# Patient Record
Sex: MALE | Race: WHITE
[De-identification: names, ages, dates, MRNs, and addresses within clinical notes are randomized per-mention and may not be internally consistent; named-entity substitution may affect disease eponyms.]

---

## 2018-04-04 ENCOUNTER — HOSPITAL ENCOUNTER (EMERGENCY)
Dept: HOSPITAL 45 - C.EDB | Age: 68
LOS: 1 days | Discharge: HOME | End: 2018-04-05
Payer: COMMERCIAL

## 2018-04-04 VITALS
WEIGHT: 240.3 LBS | WEIGHT: 240.3 LBS | BODY MASS INDEX: 35.59 KG/M2 | HEIGHT: 69.02 IN | BODY MASS INDEX: 35.59 KG/M2 | HEIGHT: 69.02 IN

## 2018-04-04 VITALS — TEMPERATURE: 98.06 F

## 2018-04-04 DIAGNOSIS — Z79.82: ICD-10-CM

## 2018-04-04 DIAGNOSIS — S09.90XA: Primary | ICD-10-CM

## 2018-04-04 DIAGNOSIS — I10: ICD-10-CM

## 2018-04-04 DIAGNOSIS — Z88.2: ICD-10-CM
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
## 2018-04-04 LAB
ALBUMIN SERPL-MCNC: 3.8 GM/DL (ref 3.4–5)
ALP SERPL-CCNC: 94 U/L (ref 45–117)
ALT SERPL-CCNC: 24 U/L (ref 12–78)
AST SERPL-CCNC: 17 U/L (ref 15–37)
BASOPHILS # BLD: 0.03 K/UL (ref 0–0.2)
BASOPHILS NFR BLD: 0.3 %
BUN SERPL-MCNC: 16 MG/DL (ref 7–18)
CA-I BLD-SCNC: 1.2 MMOL/L (ref 1.12–1.32)
CALCIUM SERPL-MCNC: 8.8 MG/DL (ref 8.5–10.1)
CO2 SERPL-SCNC: 25 MMOL/L (ref 21–32)
CREAT BLD-MCNC: 1 MG/DL (ref 0.6–1.3)
CREAT SERPL-MCNC: 1.07 MG/DL (ref 0.6–1.4)
EOS ABS #: 0.1 K/UL (ref 0–0.5)
EOSINOPHIL NFR BLD AUTO: 248 K/UL (ref 130–400)
GLUCOSE SERPL-MCNC: 92 MG/DL (ref 70–99)
HCT VFR BLD CALC: 45.2 % (ref 42–52)
HGB BLD-MCNC: 15.3 G/DL (ref 14–18)
IG#: 0.02 K/UL (ref 0–0.02)
IMM GRANULOCYTES NFR BLD AUTO: 26 %
INR PPP: 0.9 (ref 0.9–1.1)
ISTAT POTASSIUM: 3.6 MEQ/L (ref 3.3–5)
LYMPHOCYTES # BLD: 2.48 K/UL (ref 1.2–3.4)
MCH RBC QN AUTO: 26.4 PG (ref 25–34)
MCHC RBC AUTO-ENTMCNC: 33.8 G/DL (ref 32–36)
MCV RBC AUTO: 78.1 FL (ref 80–100)
MONO ABS #: 1.01 K/UL (ref 0.11–0.59)
MONOCYTES NFR BLD: 10.6 %
NEUT ABS #: 5.89 K/UL (ref 1.4–6.5)
NEUTROPHILS # BLD AUTO: 1 %
NEUTROPHILS NFR BLD AUTO: 61.9 %
PMV BLD AUTO: 10.1 FL (ref 7.4–10.4)
POTASSIUM SERPL-SCNC: 3.5 MMOL/L (ref 3.5–5.1)
PROT SERPL-MCNC: 7.5 GM/DL (ref 6.4–8.2)
PTT PATIENT: 28.7 SECONDS (ref 21–31)
RED CELL DISTRIBUTION WIDTH CV: 17 % (ref 11.5–14.5)
RED CELL DISTRIBUTION WIDTH SD: 47.6 FL (ref 36.4–46.3)
SODIUM BLD-SCNC: 139 MEQ/L (ref 135–144)
SODIUM SERPL-SCNC: 136 MMOL/L (ref 136–145)
WBC # BLD AUTO: 9.53 K/UL (ref 4.8–10.8)

## 2018-04-04 NOTE — DIAGNOSTIC IMAGING REPORT
TWO VIEW CHEST



CLINICAL HISTORY: Mediastinal widening questioned on cervical spine CT.



FINDINGS: PA and lateral chest radiographs are correlated with CT of the

cervical spine dated 4/4/2018. The heart is top normal for projection. Mild

apparent widening of the mediastinum persists.  There is atherosclerotic

calcification of the thoracic aorta. The lungs and pleural spaces are clear.

There is no pneumothorax. The bony thorax appears intact.



IMPRESSION:



1. There is no acute cardiopulmonary abnormality.



2. Mild apparent widening of the mediastinum persists. This is of indeterminant

significance, and although this may be artifactual correlation with a

contrast-enhanced CT scan of the chest is recommended for further assessment.







Electronically signed by:  Jose Chance M.D.

4/4/2018 9:54 PM



Dictated Date/Time:  4/4/2018 9:52 PM

## 2018-04-04 NOTE — DIAGNOSTIC IMAGING REPORT
CT SCAN OF THE CERVICAL SPINE



CLINICAL HISTORY: Fall.



COMPARISON STUDY:  No priors.



TECHNIQUE: CT scan of the cervical spine is performed from the skull base to the

upper thoracic spine. Images are reviewed in the axial, sagittal, and coronal

planes. IV contrast was not administered for this examination.  A dose lowering

technique was utilized adhering to the principles of ALARA.



CT DOSE: 1205.16 mGy.cm



FINDINGS:



Skeletal structures: The skeletal structures are osteopenia. There is no

evidence of fracture or subluxation involving the cervical spine. Vertebral body

height is maintained. Minimal anterolisthesis is seen at C4-C5. Alignment is

otherwise preserved. There is straightening of the cervical lordosis with mild

reversal centered at C4-C5. Anterior osteophytes are seen in the lower cervical

region. The odontoid process and lateral masses are intact. The atlantoaxial

articulation is preserved noting advanced productive degenerative change. The

spinous processes appear intact. There is moderate multilevel cervical

spondylosis. Uncovertebral and facet arthropathy contribute to neural foraminal

stenosis at several levels.



Intervertebral discs: There is moderate disc space narrowing seen at C5-C6 and

C6-C7 with associated endplate sclerosis. Mild narrowing is seen at C4-C5.



Central canal: Posterior discussed by complex is at C2-C3, C3-C4, C5-C6, and

C6-C7 likely contribute to multilevel acquired compromise of the central canal.



Soft tissues: The prevertebral and paraspinous soft tissues are within normal

limits. A subcentimeter low-attenuation nodule is noted in the left thyroid

lobe.



Calvarium: The visualized calvarium at the skull base appears intact.



Brain parenchyma: Partially visualized brain parenchyma the skull base is within

normal limits.



Sinuses and mastoids: The visualized paranasal sinuses are clear. The mastoid

air cells are well pneumatized.



Lung apices: Apical lung parenchyma is clear as visualized. Mediastinal widening

is questioned on the  tomogram.





IMPRESSION:



1. There is no evidence of fracture or subluxation involving the cervical spine.



2. Osteopenia and multilevel spondylosis as above.



3. Mediastinal widening is questioned on the  tomogram. This may be on a

technical/projectional basis. Correlation with PA and lateral chest radiographs

is recommended.







Electronically signed by:  Jose Chance M.D.

4/4/2018 8:11 PM



Dictated Date/Time:  4/4/2018 8:07 PM

## 2018-04-04 NOTE — EMERGENCY ROOM VISIT NOTE
ED Visit Note


First contact with patient:  19:05


CHIEF  COMPLAINT: Fall, head injury





HISTORY OF PRESENTING ILLNESS: This is a 67-year-old male who presents to the 

emergency department with complaint of head injury from a fall at approximately 

4:45 PM today.  Patient states that he was walking down a ramp and lost his 

footing, tripping over a curb.  He states that he fell forward onto both hands 

and his left knee then to his left side hitting the left lateral side of his 

head.  He denies loss of consciousness.  He takes a baby aspirin daily, he 

denies any other blood thinners.  He states since hitting his head that he has 

had a constant dull headache rated 3/10.  He has associated symptoms of nausea, 

dizziness, and states "I feel fuzzy and just kind of off."  He denies any 

vomiting, blurry or double vision, confusion or memory problems.  He denies any 

neck pain.  He denies any back pain, chest pain, shortness of breath, syncope, 

abdominal pain, blood in his stool or urine, numbness or weakness of the 

extremities.  He has small abrasions to both hands and to the anterior left 

knee.  He denies any pain with range of motion of the left knee or walking on 

the left leg, denies any pain with range of motion of the bilateral wrists, 

elbows, or shoulders.  Denies any ankle or hip pain.





REVIEW OF SYSTEMS: A complete 10 point review of systems was reviewed with the 

patient with pertinent positives and negatives as per history of present 

illness. All else were negative.





PAST MEDICAL HISTORY: Hypertension





SOCIAL HISTORY: Lives at home.  He denies tobacco use, alcohol or recreational 

drug use.





ALLERGIES: Reviewed in chart.





PHYSICAL EXAM:


CONSTITUTIONAL: Pleasant and cooperative.  No acute distress. Well appearing 

and well nourished.  Interacts appropriately with provider.


HEAD: Normocephalic, Atraumatic. No Diamond's Sign or Raccoon's Eyes. No 

depressed skull fractures palpable.


EYES: PERRL with EOMI bilaterally. Without subconjunctival hemorrhage. 

Palpebral conjunctiva pink and moist with no injection.


EARS:  No deformities of external structures noted on gross examination 

bilaterally. No hemotympanum present. No tympanic perforation noted.


NOSE: Midline and without cyanosis. No epistaxis or clear watery discharge 

noted. Septum midline without deviation. No septal hematoma noted. No overlying 

ecchymosis noted.


MOUTH/OROPHARYNX: Without perioral cyanosis. Tongue midline with equal 

elevation of palate bilaterally. No blood noted in the oropharynx. No tonsillar 

hypertrophy, erythema, or exudates noted. No dental fractures noted.


NECK:  Supple, FROM assessed. No nuchal rigidity.  No tenderness to palpation 

over the cervical spinous processes.  Mild left-sided cervical paraspinal 

muscle tenderness noted.


RESPIRATORY: Clear to auscultation bilaterally with no wheezing, crackles, 

rhonchi or stridor. Equal expansion bilaterally.


CARDIOVASCULAR: Regular rate and rhythm with no murmurs, rubs or gallops. 

Normal peripheral perfusion. No edema.


GASTROINTESTINAL: Soft, nontender, nondistended. No palpable masses or HSM. 

Bowel sounds present in all quadrants. 


MUSCULOSKELETAL: Full range of motion of all joints without discomfort.  No 

gross deformities noted of the extremities. +2 radial and dorsalis pedis pulses 

palpated throughout. FROM with no tremors, fasciculations, or clonus noted on 

PROM throughout. +5/5 strength noted in UE/LE bilaterally.


BACK:  No midline tenderness or paraspinous tenderness to palpation of the 

thoracic or lumbar spine.  No ecchymosis or abrasions noted.


INTEGUMENTARY: Abrasions to bilateral palms of hands, abrasion to left anterior 

knee.  No rash or other significant dermatologic conditions noted.


NEUROLOGIC: Alert and oriented X 4 with normal affect.  Cranial nerves II-XII 

grossly intact. No focal neurologic deficits noted.  Sensory intact to light 

touch throughout.  Patient able to perform rapid alternating movements 

appropriately.  Negative Romberg and Pronator Drift.








ED COURSE AND MEDICAL DECISION MAKING: 





CC: Patient presenting with complaint of fall, head injury





DIFFERENTIAL DIAGNOSIS:  Includes, but not limited to concussion, skull fracture

, intracranial hemorrhage, hypertensive urgency/emergency, aortic dissection, 

among others.





INTERPRETATION OF LABS: No leukocytosis, no anemia, no significant electrolyte 

abnormalities, normal renal function.





IMAGING:  


CT SCAN OF THE BRAIN WITHOUT IV CONTRAST





CLINICAL HISTORY: Fall. Head injury.





COMPARISON STUDY:  No priors.





TECHNIQUE: Unenhanced axial CT scan of the brain is performed from the vertex to


the skull base. A dose lowering technique was utilized adhering to the


principles of ALARA.





FINDINGS:





Brain parenchyma: There are age-related involutional changes noting  minimal


periventricular microangiopathic change. There is no hemorrhage, mass effect, or


evidence of acute territorial ischemia by CT criteria. Gray-white matter is


preserved. No extra-axial fluid collection is seen.





Ventricles, sulci, cisterns: Prominent secondary to involutional change.





Intracranial vasculature: There is atherosclerotic calcification of the


cavernous carotid arteries.





Calvarium: The skeletal structures are osteopenic. There is no depressed


calvarial fracture.





Sinuses and mastoids: The visualized paranasal sinuses are clear. The mastoid


air cells are well pneumatized.





Orbits: The bony orbits are grossly intact.








IMPRESSION: No acute intracranial abnormality.





-----





CT SCAN OF THE CERVICAL SPINE





CLINICAL HISTORY: Fall.





COMPARISON STUDY:  No priors.





TECHNIQUE: CT scan of the cervical spine is performed from the skull base to the


upper thoracic spine. Images are reviewed in the axial, sagittal, and coronal


planes. IV contrast was not administered for this examination.  A dose lowering


technique was utilized adhering to the principles of ALARA.





CT DOSE: 1205.16 mGy.cm





FINDINGS:





Skeletal structures: The skeletal structures are osteopenia. There is no


evidence of fracture or subluxation involving the cervical spine. Vertebral body


height is maintained. Minimal anterolisthesis is seen at C4-C5. Alignment is


otherwise preserved. There is straightening of the cervical lordosis with mild


reversal centered at C4-C5. Anterior osteophytes are seen in the lower cervical


region. The odontoid process and lateral masses are intact. The atlantoaxial


articulation is preserved noting advanced productive degenerative change. The


spinous processes appear intact. There is moderate multilevel cervical


spondylosis. Uncovertebral and facet arthropathy contribute to neural foraminal


stenosis at several levels.





Intervertebral discs: There is moderate disc space narrowing seen at C5-C6 and


C6-C7 with associated endplate sclerosis. Mild narrowing is seen at C4-C5.





Central canal: Posterior discussed by complex is at C2-C3, C3-C4, C5-C6, and


C6-C7 likely contribute to multilevel acquired compromise of the central canal.





Soft tissues: The prevertebral and paraspinous soft tissues are within normal


limits. A subcentimeter low-attenuation nodule is noted in the left thyroid


lobe.





Calvarium: The visualized calvarium at the skull base appears intact.





Brain parenchyma: Partially visualized brain parenchyma the skull base is within


normal limits.





Sinuses and mastoids: The visualized paranasal sinuses are clear. The mastoid


air cells are well pneumatized.





Lung apices: Apical lung parenchyma is clear as visualized. Mediastinal widening


is questioned on the  tomogram.








IMPRESSION:





1. There is no evidence of fracture or subluxation involving the cervical spine.





2. Osteopenia and multilevel spondylosis as above.





3. Mediastinal widening is questioned on the  tomogram. This may be on a


technical/projectional basis. Correlation with PA and lateral chest radiographs


is recommended.





-----





TWO VIEW CHEST





CLINICAL HISTORY: Mediastinal widening questioned on cervical spine CT.





FINDINGS: PA and lateral chest radiographs are correlated with CT of the


cervical spine dated 4/4/2018. The heart is top normal for projection. Mild


apparent widening of the mediastinum persists.  There is atherosclerotic


calcification of the thoracic aorta. The lungs and pleural spaces are clear.


There is no pneumothorax. The bony thorax appears intact.





IMPRESSION:





1. There is no acute cardiopulmonary abnormality.





2. Mild apparent widening of the mediastinum persists. This is of indeterminant


significance, and although this may be artifactual correlation with a


contrast-enhanced CT scan of the chest is recommended for further assessment.





 


EKG: Normal sinus rhythm with a rate of 90 bpm, right bundle branch block by my 

interpretation.  No previous EKGs available for comparison.  Patient does 

report a history of a right bundle branch block





MEDICATION RECONCILIATION:  I attest that I have personally reviewed the patient

's current medication list.





INITIAL VITAL SIGNS REVIEW:  I reviewed the patient's initial vital signs and 

interpret them as follows: T: Afebrile;  BP: Hypertensive;  HR: Tachycardic;  RR

: Within normal limit;  Pulse Ox: Within normal limits on room air.


Blood pressure screening: The patient was found to have an elevated blood 

pressure and was referred to their primary doctor for recheck and further 

treatment.





SUMMARY: 


Patient was evaluated at bedside, history and physical exam performed.


Patient is alert and oriented, in no acute distress, resting calmly on the 

stretcher.


Neurologic exam is intact with no focal deficits.  He does complain of a 

headache and some left-sided neck pain.


Patient has mild abrasions to the bilateral hands and left knee.  Full range of 

motion of the knee, hands, and wrists without any pain or weakness.


Patient is noted to be markedly hypertensive, states he takes clonidine twice a 

day, but states he only took half of his usual dose this morning.


The patient states it is not unusual for him to have blood pressures in the 180s

-200s/100s-120s.


Orders were placed at bedside for EKG, Tylenol for headache, CT head and 

cervical spine to evaluate for trauma.


Patient discussed with Dr. Russo, who agrees with my assessment and plan.


Imaging reviewed as above, concern for mediastinal widening on chest x-ray and 

cervical spine CT. The patient continues to deny any chest pain, shortness of 

breath, back pain, and his neurovascular exam remains intact on reassessment.


Given this finding and the patient's history for significant hypertension, I 

feel it is the patient's best interest to fully evaluate for the possibility of 

dissection.  I discussed this with the patient, he verbalized understanding and 

was in agreement.


Labs were ordered.  CTA dissection study of the chest was ordered.


The patient has been persistently hypertensive, though he does note he is due 

for his clonidine.  The patient took his own home dose of PO clonidine, 0.1 mg, 

at 11 PM, with my direct supervision.  Will continue to closely monitor his 

blood pressure.


Patient reassessed multiple times throughout ED stay, he has remained well-

appearing and stable, his tachycardia is downtrending.


Patient was signed out to Florentin White PA-C, at change of shift, awaiting 

results of the CTA.  Patient was stable at time of sign-out.


Current/Historical Medications


Scheduled


Aspirin (Aspirin Ec), 81 MG PO DAILY


Clonidine Hcl (Catapres), 0.1 MG PO BID


Diazepam (Valium), 0.5 TAB PO PRN UD


Eszopiclone (Lunesta), 3 MG PO HS


Rabeprazole Sodium (Aciphex), 40 MG PO DAILY





Scheduled PRN


Acetaminophen (Tylenol), 750-1,000 MG PO BID PRN for Pain





Allergies


Coded Allergies:  


     Sulfa Antibiotics (Verified  Allergy, Severe, FATIGUE, 4/4/18)


Uncoded Allergies:  


     ADULT STRENGTH ASPIRIN (Allergy, Intermediate, HEAD, HAND HIVES, 4/4/18)





Vital Signs











  Date Time  Temp Pulse Resp B/P (MAP) Pulse Ox O2 Delivery O2 Flow Rate FiO2


 


4/4/18 23:12  80 18 200/125 97 Room Air  


 


4/4/18 22:57  81   96 Room Air  


 


4/4/18 22:44    206/112    


 


4/4/18 22:42    209/112    


 


4/4/18 21:27  83   95   


 


4/4/18 21:22  83 22 180/108 95   


 


4/4/18 19:26  97 20  95 Room Air  


 


4/4/18 19:21    186/109    


 


4/4/18 19:01 36.7 103 20 207/119 95 Room Air  











Laboratory Results


4/4/18 22:50








Red Blood Count 5.79, Mean Corpuscular Volume 78.1, Mean Corpuscular Hemoglobin 

26.4, Mean Corpuscular Hemoglobin Concent 33.8, Mean Platelet Volume 10.1, 

Neutrophils (%) (Auto) 61.9, Lymphocytes (%) (Auto) 26.0, Monocytes (%) (Auto) 

10.6, Eosinophils (%) (Auto) 1.0, Basophils (%) (Auto) 0.3, Neutrophils # (Auto

) 5.89, Lymphocytes # (Auto) 2.48, Monocytes # (Auto) 1.01, Eosinophils # (Auto

) 0.10, Basophils # (Auto) 0.03














Test


  4/4/18


22:50 4/4/18


22:58


 


White Blood Count


  9.53 K/uL


(4.8-10.8) 


 


 


Red Blood Count


  5.79 M/uL


(4.7-6.1) 


 


 


Hemoglobin


  15.3 g/dL


(14.0-18.0) 


 


 


Hematocrit 45.2 % (42-52)  


 


Mean Corpuscular Volume


  78.1 fL


() 


 


 


Mean Corpuscular Hemoglobin


  26.4 pg


(25-34) 


 


 


Mean Corpuscular Hemoglobin


Concent 33.8 g/dl


(32-36) 


 


 


Platelet Count


  248 K/uL


(130-400) 


 


 


Mean Platelet Volume


  10.1 fL


(7.4-10.4) 


 


 


Neutrophils (%) (Auto) 61.9 %  


 


Lymphocytes (%) (Auto) 26.0 %  


 


Monocytes (%) (Auto) 10.6 %  


 


Eosinophils (%) (Auto) 1.0 %  


 


Basophils (%) (Auto) 0.3 %  


 


Neutrophils # (Auto)


  5.89 K/uL


(1.4-6.5) 


 


 


Lymphocytes # (Auto)


  2.48 K/uL


(1.2-3.4) 


 


 


Monocytes # (Auto)


  1.01 K/uL


(0.11-0.59) 


 


 


Eosinophils # (Auto)


  0.10 K/uL


(0-0.5) 


 


 


Basophils # (Auto)


  0.03 K/uL


(0-0.2) 


 


 


RDW Standard Deviation


  47.6 fL


(36.4-46.3) 


 


 


RDW Coefficient of Variation


  17.0 %


(11.5-14.5) 


 


 


Immature Granulocyte % (Auto) 0.2 %  


 


Immature Granulocyte # (Auto)


  0.02 K/uL


(0.00-0.02) 


 


 


Bedside Hemoglobin


  


  15.6 g/dl


(14.0-18.0)


 


Bedside Hematocrit  46 % (42-52) 


 


Bedside Sodium


  


  139 mEq/L


(135-144)


 


Bedside Potassium


  


  3.6 mEq/L


(3.3-5.0)


 


Bedside Chloride


  


  102 mEq/L


(101-112)


 


Bedside Total CO2


  


  24 mEq/l


(24-31)


 


Anion Gap


  


  17.0 mmol/L


(16-25)


 


Bedside Blood Urea Nitrogen


  


  16 mg/dl


(7-18)


 


Bedside Creatinine


  


  1.0 mg/dl


(0.6-1.3)


 


Bedside Glucose (other)


  


  98 mg/dl


(70-99)


 


Bedside Ionized Calcium ([place])


  


  1.20 mmol/l


(1.12-1.32)











Medications Administered











 Medications


  (Trade)  Dose


 Ordered  Sig/Julia


 Route  Start Time


 Stop Time Status Last Admin


Dose Admin


 


 Acetaminophen


  (Tylenol Tab)  1,000 mg  NOW  STAT


 PO  4/4/18 19:18


 4/4/18 19:20 DC 4/4/18 19:28


1,000 MG











Departure Information


Impression





 Primary Impression:  


 Fall





Referrals


No Doctor, Assigned (PCP)





Patient Instructions


[facility]





Problem Qualifiers








 Primary Impression:  


 Fall


 Encounter type:  initial encounter  Qualified Codes:  W19.XXXA - Unspecified 

fall, initial encounter

## 2018-04-04 NOTE — DIAGNOSTIC IMAGING REPORT
CT SCAN OF THE BRAIN WITHOUT IV CONTRAST



CLINICAL HISTORY: Fall. Head injury.



COMPARISON STUDY:  No priors.



TECHNIQUE: Unenhanced axial CT scan of the brain is performed from the vertex to

the skull base. A dose lowering technique was utilized adhering to the

principles of ALARA.



FINDINGS:



Brain parenchyma: There are age-related involutional changes noting  minimal

periventricular microangiopathic change. There is no hemorrhage, mass effect, or

evidence of acute territorial ischemia by CT criteria. Gray-white matter is

preserved. No extra-axial fluid collection is seen.



Ventricles, sulci, cisterns: Prominent secondary to involutional change.



Intracranial vasculature: There is atherosclerotic calcification of the

cavernous carotid arteries.



Calvarium: The skeletal structures are osteopenic. There is no depressed

calvarial fracture.



Sinuses and mastoids: The visualized paranasal sinuses are clear. The mastoid

air cells are well pneumatized.



Orbits: The bony orbits are grossly intact.





IMPRESSION: No acute intracranial abnormality.







Electronically signed by:  Jose Chance M.D.

4/4/2018 8:07 PM



Dictated Date/Time:  4/4/2018 8:05 PM

## 2018-04-05 VITALS — OXYGEN SATURATION: 97 % | HEART RATE: 81 BPM

## 2018-04-05 VITALS — DIASTOLIC BLOOD PRESSURE: 98 MMHG | SYSTOLIC BLOOD PRESSURE: 165 MMHG

## 2018-04-05 NOTE — DIAGNOSTIC IMAGING REPORT
CHEST COMBO ANGIO DISSECTION



CLINICAL HISTORY: Widened mediastinum on prior imaging studies. Fall.    



COMPARISON STUDY:  Chest radiograph April 4, 2018.



TECHNIQUE: Unenhanced and arterial phase imaging of the chest was performed.

Injection of 118 cc of Optiray 320 IV was uneventful. Sagittal and coronal

reconstructions were viewed as well as maximal intensity projections on an

independent 3-D workstation.



FINDINGS: Mediastinal widening on prior exams is due to a tortuous ascending

aorta and mediastinal fat. There is no thoracic aortic dissection. Caliber of

the ascending aorta is at the upper limits of normal. Size of the heart is

normal. There is no pericardial effusion. No pulmonary emboli are identified.

There is no pneumothorax or pleural effusion. A few small lung nodules are

likely benign. No acute rib or thoracic spine fracture is identified. A few

small hypervascular foci within the spleen are indeterminate but statistically

benign. 



IMPRESSION:  



1. No thoracic aortic dissection. Mediastinal widening on prior exams is due to

mediastinal fat and a slightly tortuous ascending aorta which is at the upper

limits of normal for caliber.



2. No acute intrathoracic findings.









Electronically signed by:  Troy Smallwood M.D.

4/5/2018 7:00 AM



Dictated Date/Time:  4/5/2018 6:44 AM

## 2018-04-05 NOTE — EMERGENCY ROOM VISIT NOTE
ED Visit Note


First contact with patient:  23:26


Patient care was assumed from ANGEL Muñoz, at the time of shift change.  

Please see her dictation for full history of present illness and emergency 

department course prior to my assumption of care.  





In short, the patient suffered a fall today, with subsequent left-sided head 

and neck injury.  CT scans of the head and neck were performed, and there was 

concern for possible widened mediastinum on incidental reading of the CT of the 

neck.  Because of this a CT scan of the chest was performed, and blood work was 

obtained.





The patient's blood work does not reveal a significantly elevated white blood 

cell count, gross anemia, bandemia, or significant electrolyte imbalance.  

Troponin is negative.  The patient's CT scan of the chest did return and was 

read by [facility] as showing no significant cardiovascular abnormalities or 

mediastinal mass to explain clinical presentation.  Additionally there is no 

evidence of PE or other significant findings.





I discussed options of care with the patient who appears well for discharge 

home.  He does have an elevated blood pressure, however the sounds like it is 

chronic and he follows regularly with his PCP for this.  He does have clonidine 

that he takes up to 3 times daily for control, and I recommended that he 

continue this.  The patient  was otherwise invited back to the ER with any new, 

worsening, or concerning symptoms.


Current/Historical Medications


Scheduled


Aspirin (Aspirin Ec), 81 MG PO DAILY


Clonidine Hcl (Catapres), 0.1 MG PO BID


Diazepam (Valium), 0.5 TAB PO PRN UD


Eszopiclone (Lunesta), 3 MG PO HS


Rabeprazole Sodium (Aciphex), 40 MG PO DAILY





Scheduled PRN


Acetaminophen (Tylenol), 750-1,000 MG PO BID PRN for Pain





Allergies


Coded Allergies:  


     Sulfa Antibiotics (Verified  Allergy, Severe, FATIGUE, 4/4/18)


Uncoded Allergies:  


     ADULT STRENGTH ASPIRIN (Allergy, Intermediate, HEAD, HAND HIVES, 4/4/18)





Vital Signs











  Date Time  Temp Pulse Resp B/P (MAP) Pulse Ox O2 Delivery O2 Flow Rate FiO2


 


4/4/18 23:12  80 18 200/125 97 Room Air  


 


4/4/18 22:57  81   96 Room Air  


 


4/4/18 22:44    206/112    


 


4/4/18 22:42    209/112    


 


4/4/18 21:27  83   95   


 


4/4/18 21:22  83 22 180/108 95   


 


4/4/18 19:26  97 20  95 Room Air  


 


4/4/18 19:21    186/109    


 


4/4/18 19:01 36.7 103 20 207/119 95 Room Air  











Laboratory Results


4/4/18 22:50








Red Blood Count 5.79, Mean Corpuscular Volume 78.1, Mean Corpuscular Hemoglobin 

26.4, Mean Corpuscular Hemoglobin Concent 33.8, Mean Platelet Volume 10.1, 

Neutrophils (%) (Auto) 61.9, Lymphocytes (%) (Auto) 26.0, Monocytes (%) (Auto) 

10.6, Eosinophils (%) (Auto) 1.0, Basophils (%) (Auto) 0.3, Neutrophils # (Auto

) 5.89, Lymphocytes # (Auto) 2.48, Monocytes # (Auto) 1.01, Eosinophils # (Auto

) 0.10, Basophils # (Auto) 0.03





4/4/18 22:50

















Test


  4/4/18


22:50 4/4/18


22:58


 


White Blood Count


  9.53 K/uL


(4.8-10.8) 


 


 


Red Blood Count


  5.79 M/uL


(4.7-6.1) 


 


 


Hemoglobin


  15.3 g/dL


(14.0-18.0) 


 


 


Hematocrit 45.2 % (42-52)  


 


Mean Corpuscular Volume


  78.1 fL


() 


 


 


Mean Corpuscular Hemoglobin


  26.4 pg


(25-34) 


 


 


Mean Corpuscular Hemoglobin


Concent 33.8 g/dl


(32-36) 


 


 


Platelet Count


  248 K/uL


(130-400) 


 


 


Mean Platelet Volume


  10.1 fL


(7.4-10.4) 


 


 


Neutrophils (%) (Auto) 61.9 %  


 


Lymphocytes (%) (Auto) 26.0 %  


 


Monocytes (%) (Auto) 10.6 %  


 


Eosinophils (%) (Auto) 1.0 %  


 


Basophils (%) (Auto) 0.3 %  


 


Neutrophils # (Auto)


  5.89 K/uL


(1.4-6.5) 


 


 


Lymphocytes # (Auto)


  2.48 K/uL


(1.2-3.4) 


 


 


Monocytes # (Auto)


  1.01 K/uL


(0.11-0.59) 


 


 


Eosinophils # (Auto)


  0.10 K/uL


(0-0.5) 


 


 


Basophils # (Auto)


  0.03 K/uL


(0-0.2) 


 


 


RDW Standard Deviation


  47.6 fL


(36.4-46.3) 


 


 


RDW Coefficient of Variation


  17.0 %


(11.5-14.5) 


 


 


Immature Granulocyte % (Auto) 0.2 %  


 


Immature Granulocyte # (Auto)


  0.02 K/uL


(0.00-0.02) 


 


 


Prothrombin Time


  9.8 SECONDS


(9.0-12.0) 


 


 


Prothromb Time International


Ratio 0.9 (0.9-1.1) 


  


 


 


Activated Partial


Thromboplast Time 28.7 SECONDS


(21.0-31.0) 


 


 


Partial Thromboplastin Ratio 1.1  


 


Est Creatinine Clear Calc


Drug Dose 83.8 ml/min 


  


 


 


Estimated GFR (


American) 84.0 


  


 


 


Estimated GFR (Non-


American 72.5 


  


 


 


BUN/Creatinine Ratio 14.6 (10-20)  


 


Calcium Level


  8.8 mg/dl


(8.5-10.1) 


 


 


Total Bilirubin


  0.5 mg/dl


(0.2-1) 


 


 


Aspartate Amino Transf


(AST/SGOT) 17 U/L (15-37) 


  


 


 


Alanine Aminotransferase


(ALT/SGPT) 24 U/L (12-78) 


  


 


 


Alkaline Phosphatase


  94 U/L


() 


 


 


Troponin I


  < 0.015 ng/ml


(0-0.045) 


 


 


Total Protein


  7.5 gm/dl


(6.4-8.2) 


 


 


Albumin


  3.8 gm/dl


(3.4-5.0) 


 


 


Globulin


  3.7 gm/dl


(2.5-4.0) 


 


 


Albumin/Globulin Ratio 1.0 (0.9-2)  


 


Bedside Hemoglobin


  


  15.6 g/dl


(14.0-18.0)


 


Bedside Hematocrit  46 % (42-52) 


 


Bedside Sodium


  


  139 mEq/L


(135-144)


 


Bedside Potassium


  


  3.6 mEq/L


(3.3-5.0)


 


Bedside Chloride


  


  102 mEq/L


(101-112)


 


Bedside Total CO2


  


  24 mEq/l


(24-31)


 


Anion Gap


  


  17.0 mmol/L


(16-25)


 


Bedside Blood Urea Nitrogen


  


  16 mg/dl


(7-18)


 


Bedside Creatinine


  


  1.0 mg/dl


(0.6-1.3)


 


Bedside Glucose (other)


  


  98 mg/dl


(70-99)


 


Bedside Ionized Calcium (Sreedhar)


  


  1.20 mmol/l


(1.12-1.32)











Medications Administered











 Medications


  (Trade)  Dose


 Ordered  Sig/Julia


 Route  Start Time


 Stop Time Status Last Admin


Dose Admin


 


 Acetaminophen


  (Tylenol Tab)  1,000 mg  NOW  STAT


 PO  4/4/18 19:18


 4/4/18 19:20 DC 4/4/18 19:28


1,000 MG











Departure Information


Impression





 Primary Impression:  


 Fall


 Additional Impression:  


 Elevated blood pressure reading





Dispostion


Home / Self-Care





Condition


GOOD





Forms


HOME CARE DOCUMENTATION FORM,                                                 

               IMPORTANT VISIT INFORMATION





Patient Instructions


My [facility]





Additional Instructions





You were seen and evaluated today on an emergency basis only.  This is not a 

substitute for, or an effort to provide, complete comprehensive medical care.  

It is not possible to recognize and treat all injuries or illnesses in a single 

emergency department visit. For this reason it is recommended that you followup 

with your primary care physician with any ongoing or persistent symptoms.





Continue your blood pressure medications at home as prescribed.





For baseline pain relief you may alternate ibuprofen and acetaminophen every 4 

hours for pain control. Take 600 mg ibuprofen (Advil) and then 4 hours later 

take 1000 mg acetaminophen (Tylenol). Do not take more than 3000 mg 

acetaminophen in a single day.





You are welcome to return to the emergency department anytime with new, 

worsening, or concerning symptoms.





Problem Qualifiers